# Patient Record
Sex: FEMALE | Race: WHITE | NOT HISPANIC OR LATINO | URBAN - METROPOLITAN AREA
[De-identification: names, ages, dates, MRNs, and addresses within clinical notes are randomized per-mention and may not be internally consistent; named-entity substitution may affect disease eponyms.]

---

## 2017-02-06 ENCOUNTER — OFFICE VISIT - HEALTHEAST (OUTPATIENT)
Dept: FAMILY MEDICINE | Facility: CLINIC | Age: 37
End: 2017-02-06

## 2017-02-06 DIAGNOSIS — F33.1 MODERATE RECURRENT MAJOR DEPRESSION (H): ICD-10-CM

## 2017-02-06 DIAGNOSIS — F41.1 GENERALIZED ANXIETY DISORDER: ICD-10-CM

## 2017-03-21 ENCOUNTER — OFFICE VISIT - HEALTHEAST (OUTPATIENT)
Dept: FAMILY MEDICINE | Facility: CLINIC | Age: 37
End: 2017-03-21

## 2017-03-21 DIAGNOSIS — J06.9 UPPER RESPIRATORY TRACT INFECTION, UNSPECIFIED TYPE: ICD-10-CM

## 2017-03-21 DIAGNOSIS — J02.9 SORETHROAT: ICD-10-CM

## 2017-04-10 ENCOUNTER — OFFICE VISIT - HEALTHEAST (OUTPATIENT)
Dept: FAMILY MEDICINE | Facility: CLINIC | Age: 37
End: 2017-04-10

## 2017-04-10 DIAGNOSIS — F41.1 GENERALIZED ANXIETY DISORDER: ICD-10-CM

## 2017-04-10 DIAGNOSIS — F33.1 MODERATE RECURRENT MAJOR DEPRESSION (H): ICD-10-CM

## 2017-04-10 ASSESSMENT — MIFFLIN-ST. JEOR: SCORE: 1161.69

## 2017-08-18 ENCOUNTER — COMMUNICATION - HEALTHEAST (OUTPATIENT)
Dept: FAMILY MEDICINE | Facility: CLINIC | Age: 37
End: 2017-08-18

## 2017-10-12 ENCOUNTER — AMBULATORY - HEALTHEAST (OUTPATIENT)
Dept: LAB | Facility: CLINIC | Age: 37
End: 2017-10-12

## 2017-10-12 ENCOUNTER — AMBULATORY - HEALTHEAST (OUTPATIENT)
Dept: OBGYN | Facility: CLINIC | Age: 37
End: 2017-10-12

## 2017-10-12 ENCOUNTER — HOSPITAL ENCOUNTER (OUTPATIENT)
Dept: ULTRASOUND IMAGING | Facility: CLINIC | Age: 37
Discharge: HOME | End: 2017-10-12
Attending: ADVANCED PRACTICE MIDWIFE

## 2017-10-12 ENCOUNTER — PRENATAL OFFICE VISIT - HEALTHEAST (OUTPATIENT)
Dept: MIDWIFE SERVICES | Facility: CLINIC | Age: 37
End: 2017-10-12

## 2017-10-12 DIAGNOSIS — O09.529 SUPERVISION OF HIGH-RISK PREGNANCY OF ELDERLY MULTIGRAVIDA: ICD-10-CM

## 2017-10-12 DIAGNOSIS — Z23 INFLUENZA VACCINE ADMINISTERED: ICD-10-CM

## 2017-10-12 LAB — HIV 1+2 AB+HIV1 P24 AG SERPL QL IA: NEGATIVE

## 2017-10-12 ASSESSMENT — MIFFLIN-ST. JEOR: SCORE: 1166.68

## 2017-10-13 LAB
HBV SURFACE AG SERPL QL IA: NEGATIVE
SYPHILIS RPR SCREEN - HISTORICAL: NORMAL

## 2017-11-09 ENCOUNTER — PRENATAL OFFICE VISIT - HEALTHEAST (OUTPATIENT)
Dept: MIDWIFE SERVICES | Facility: CLINIC | Age: 37
End: 2017-11-09

## 2017-11-09 DIAGNOSIS — O09.529 SUPERVISION OF HIGH-RISK PREGNANCY OF ELDERLY MULTIGRAVIDA: ICD-10-CM

## 2017-11-09 DIAGNOSIS — O09.522 ELDERLY MULTIGRAVIDA IN SECOND TRIMESTER: ICD-10-CM

## 2017-11-09 RX ORDER — CHLORAL HYDRATE 500 MG
2 CAPSULE ORAL DAILY
Status: SHIPPED | COMMUNITY
Start: 2017-11-09

## 2017-11-09 ASSESSMENT — MIFFLIN-ST. JEOR: SCORE: 1181.19

## 2017-11-14 ENCOUNTER — OFFICE VISIT - HEALTHEAST (OUTPATIENT)
Dept: FAMILY MEDICINE | Facility: CLINIC | Age: 37
End: 2017-11-14

## 2017-11-14 DIAGNOSIS — F41.1 GENERALIZED ANXIETY DISORDER: ICD-10-CM

## 2017-11-14 DIAGNOSIS — O09.522 ELDERLY MULTIGRAVIDA IN SECOND TRIMESTER: ICD-10-CM

## 2017-11-14 ASSESSMENT — MIFFLIN-ST. JEOR: SCORE: 1182.55

## 2017-12-11 ENCOUNTER — COMMUNICATION - HEALTHEAST (OUTPATIENT)
Dept: MIDWIFE SERVICES | Facility: CLINIC | Age: 37
End: 2017-12-11

## 2017-12-11 ENCOUNTER — AMBULATORY - HEALTHEAST (OUTPATIENT)
Dept: MIDWIFE SERVICES | Facility: CLINIC | Age: 37
End: 2017-12-11

## 2017-12-11 DIAGNOSIS — Z34.82 ENCOUNTER FOR SUPERVISION OF OTHER NORMAL PREGNANCY IN SECOND TRIMESTER: ICD-10-CM

## 2017-12-21 ENCOUNTER — HOSPITAL ENCOUNTER (OUTPATIENT)
Dept: ULTRASOUND IMAGING | Facility: CLINIC | Age: 37
Discharge: HOME | End: 2017-12-21
Attending: ADVANCED PRACTICE MIDWIFE

## 2017-12-21 ENCOUNTER — PRENATAL OFFICE VISIT - HEALTHEAST (OUTPATIENT)
Dept: MIDWIFE SERVICES | Facility: CLINIC | Age: 37
End: 2017-12-21

## 2017-12-21 DIAGNOSIS — O09.529 SUPERVISION OF HIGH-RISK PREGNANCY OF ELDERLY MULTIGRAVIDA: ICD-10-CM

## 2017-12-21 DIAGNOSIS — Z34.82 ENCOUNTER FOR SUPERVISION OF OTHER NORMAL PREGNANCY IN SECOND TRIMESTER: ICD-10-CM

## 2017-12-21 ASSESSMENT — MIFFLIN-ST. JEOR: SCORE: 1219.29

## 2017-12-22 ENCOUNTER — AMBULATORY - HEALTHEAST (OUTPATIENT)
Dept: MIDWIFE SERVICES | Facility: CLINIC | Age: 37
End: 2017-12-22

## 2018-02-06 ENCOUNTER — COMMUNICATION - HEALTHEAST (OUTPATIENT)
Dept: ADMINISTRATIVE | Facility: CLINIC | Age: 38
End: 2018-02-06

## 2018-02-08 ENCOUNTER — PRENATAL OFFICE VISIT - HEALTHEAST (OUTPATIENT)
Dept: MIDWIFE SERVICES | Facility: CLINIC | Age: 38
End: 2018-02-08

## 2018-02-08 DIAGNOSIS — R73.09 ELEVATED GLUCOSE TOLERANCE TEST: ICD-10-CM

## 2018-02-08 DIAGNOSIS — O09.529 SUPERVISION OF HIGH-RISK PREGNANCY OF ELDERLY MULTIGRAVIDA: ICD-10-CM

## 2018-02-08 LAB
FASTING STATUS PATIENT QL REPORTED: NO
GLUCOSE 1H P 50 G GLC PO SERPL-MCNC: 164 MG/DL (ref 70–139)
HGB BLD-MCNC: 11.4 G/DL (ref 12–16)

## 2018-02-08 ASSESSMENT — MIFFLIN-ST. JEOR: SCORE: 1256.94

## 2018-02-09 LAB — SYPHILIS RPR SCREEN - HISTORICAL: NORMAL

## 2018-02-12 ENCOUNTER — AMBULATORY - HEALTHEAST (OUTPATIENT)
Dept: OBGYN | Facility: CLINIC | Age: 38
End: 2018-02-12

## 2018-02-12 ENCOUNTER — HOSPITAL ENCOUNTER (OUTPATIENT)
Dept: OBGYN | Facility: HOSPITAL | Age: 38
Discharge: HOME | End: 2018-02-12
Attending: MIDWIFE | Admitting: MIDWIFE

## 2018-02-12 ENCOUNTER — COMMUNICATION - HEALTHEAST (OUTPATIENT)
Dept: OBGYN | Facility: CLINIC | Age: 38
End: 2018-02-12

## 2018-02-12 ENCOUNTER — AMBULATORY - HEALTHEAST (OUTPATIENT)
Dept: LAB | Facility: CLINIC | Age: 38
End: 2018-02-12

## 2018-02-12 DIAGNOSIS — R73.09 ELEVATED GLUCOSE TOLERANCE TEST: ICD-10-CM

## 2018-02-12 LAB
ALBUMIN UR-MCNC: NEGATIVE MG/DL
APPEARANCE UR: CLEAR
BACTERIA #/AREA URNS HPF: ABNORMAL HPF
BILIRUB UR QL STRIP: NEGATIVE
CLUE CELLS: NORMAL
COLOR UR AUTO: COLORLESS
FASTING STATUS PATIENT QL REPORTED: YES
FIBRONECTIN FETAL VAG QL: NEGATIVE
GLUCOSE 1H P 100 G GLC PO SERPL-MCNC: 86 MG/DL
GLUCOSE 2H P 100 G GLC PO SERPL-MCNC: 85 MG/DL
GLUCOSE 3H P 100 G GLC PO SERPL-MCNC: 76 MG/DL
GLUCOSE P FAST SERPL-MCNC: 83 MG/DL
GLUCOSE UR STRIP-MCNC: NEGATIVE MG/DL
HGB UR QL STRIP: NEGATIVE
KETONES UR STRIP-MCNC: NEGATIVE MG/DL
LEUKOCYTE ESTERASE UR QL STRIP: ABNORMAL
NITRATE UR QL: NEGATIVE
PH UR STRIP: 6 [PH] (ref 4.5–8)
RBC #/AREA URNS AUTO: ABNORMAL HPF
RUPTURE OF FETAL MEMBRANES BY ROM PLUS: NEGATIVE
SP GR UR STRIP: 1 (ref 1–1.03)
SQUAMOUS #/AREA URNS AUTO: ABNORMAL LPF
TRICHOMONAS, WET PREP: NORMAL
UROBILINOGEN UR STRIP-ACNC: ABNORMAL
WBC #/AREA URNS AUTO: ABNORMAL HPF
YEAST, WET PREP: NORMAL

## 2018-02-12 ASSESSMENT — MIFFLIN-ST. JEOR: SCORE: 1256.49

## 2018-02-13 LAB
ALCOHOL, URINE (MT) - HISTORICAL: NEGATIVE GM/DL
ALLERGIC TO PENICILLIN: NO
AMPHETAMINES (MT) - HISTORICAL: NEGATIVE
BACTERIA SPEC CULT: NO GROWTH
C TRACH DNA SPEC QL PROBE+SIG AMP: NEGATIVE
COCAINE (MT) - HISTORICAL: NEGATIVE
CREAT UR-MCNC: 11 MG/DL
GP B STREP DNA SPEC QL NAA+PROBE: NEGATIVE
N GONORRHOEA DNA SPEC QL NAA+PROBE: NEGATIVE
OPIATES (MT) - HISTORICAL: NEGATIVE
OXYCODONE SERPLBLD CFM-MCNC: NEGATIVE NG/ML
PHENCYCLIDINE (PCP)MT - HISTORICAL: NEGATIVE
SPECIMEN STATUS: ABNORMAL
THC MARIJUANA METABOLITE - HISTORICAL: NEGATIVE

## 2018-02-14 ENCOUNTER — COMMUNICATION - HEALTHEAST (OUTPATIENT)
Dept: FAMILY MEDICINE | Facility: CLINIC | Age: 38
End: 2018-02-14

## 2018-02-22 ENCOUNTER — COMMUNICATION - HEALTHEAST (OUTPATIENT)
Dept: MIDWIFE SERVICES | Facility: CLINIC | Age: 38
End: 2018-02-22

## 2018-02-22 DIAGNOSIS — Z20.828 EXPOSURE TO INFLUENZA: ICD-10-CM

## 2018-02-23 ENCOUNTER — PRENATAL OFFICE VISIT - HEALTHEAST (OUTPATIENT)
Dept: MIDWIFE SERVICES | Facility: CLINIC | Age: 38
End: 2018-02-23

## 2018-02-23 DIAGNOSIS — O09.529 SUPERVISION OF HIGH-RISK PREGNANCY OF ELDERLY MULTIGRAVIDA: ICD-10-CM

## 2018-02-23 DIAGNOSIS — O09.523 ELDERLY MULTIGRAVIDA IN THIRD TRIMESTER: ICD-10-CM

## 2018-02-23 ASSESSMENT — MIFFLIN-ST. JEOR: SCORE: 1273.27

## 2018-02-28 ENCOUNTER — OFFICE VISIT - HEALTHEAST (OUTPATIENT)
Dept: FAMILY MEDICINE | Facility: CLINIC | Age: 38
End: 2018-02-28

## 2018-02-28 DIAGNOSIS — F33.0 MILD RECURRENT MAJOR DEPRESSION (H): ICD-10-CM

## 2018-02-28 DIAGNOSIS — F41.1 GENERALIZED ANXIETY DISORDER: ICD-10-CM

## 2018-02-28 ASSESSMENT — MIFFLIN-ST. JEOR: SCORE: 1276.45

## 2018-03-09 ENCOUNTER — PRENATAL OFFICE VISIT - HEALTHEAST (OUTPATIENT)
Dept: MIDWIFE SERVICES | Facility: CLINIC | Age: 38
End: 2018-03-09

## 2018-03-09 DIAGNOSIS — F41.1 GENERALIZED ANXIETY DISORDER: ICD-10-CM

## 2018-03-09 DIAGNOSIS — O09.529 SUPERVISION OF HIGH-RISK PREGNANCY OF ELDERLY MULTIGRAVIDA: ICD-10-CM

## 2018-03-09 ASSESSMENT — MIFFLIN-ST. JEOR: SCORE: 1283.7

## 2018-03-23 ENCOUNTER — PRENATAL OFFICE VISIT - HEALTHEAST (OUTPATIENT)
Dept: MIDWIFE SERVICES | Facility: CLINIC | Age: 38
End: 2018-03-23

## 2018-03-23 DIAGNOSIS — O09.529 SUPERVISION OF HIGH-RISK PREGNANCY OF ELDERLY MULTIGRAVIDA: ICD-10-CM

## 2018-03-23 ASSESSMENT — MIFFLIN-ST. JEOR: SCORE: 1292.78

## 2018-04-05 ENCOUNTER — PRENATAL OFFICE VISIT - HEALTHEAST (OUTPATIENT)
Dept: MIDWIFE SERVICES | Facility: CLINIC | Age: 38
End: 2018-04-05

## 2018-04-05 DIAGNOSIS — O09.529 SUPERVISION OF HIGH-RISK PREGNANCY OF ELDERLY MULTIGRAVIDA: ICD-10-CM

## 2018-04-05 LAB — HGB BLD-MCNC: 11 G/DL (ref 12–16)

## 2018-04-05 ASSESSMENT — MIFFLIN-ST. JEOR: SCORE: 1315.46

## 2018-04-06 LAB
ALLERGIC TO PENICILLIN: NO
GP B STREP DNA SPEC QL NAA+PROBE: NEGATIVE

## 2018-04-13 ENCOUNTER — PRENATAL OFFICE VISIT - HEALTHEAST (OUTPATIENT)
Dept: MIDWIFE SERVICES | Facility: CLINIC | Age: 38
End: 2018-04-13

## 2018-04-13 DIAGNOSIS — O09.529 SUPERVISION OF HIGH-RISK PREGNANCY OF ELDERLY MULTIGRAVIDA: ICD-10-CM

## 2018-04-13 ASSESSMENT — MIFFLIN-ST. JEOR: SCORE: 1319.99

## 2018-04-19 ENCOUNTER — COMMUNICATION - HEALTHEAST (OUTPATIENT)
Dept: MIDWIFE SERVICES | Facility: CLINIC | Age: 38
End: 2018-04-19

## 2018-04-20 ENCOUNTER — PRENATAL OFFICE VISIT - HEALTHEAST (OUTPATIENT)
Dept: MIDWIFE SERVICES | Facility: CLINIC | Age: 38
End: 2018-04-20

## 2018-04-20 DIAGNOSIS — O09.529 SUPERVISION OF HIGH-RISK PREGNANCY OF ELDERLY MULTIGRAVIDA: ICD-10-CM

## 2018-04-20 ASSESSMENT — MIFFLIN-ST. JEOR: SCORE: 1338.14

## 2018-04-27 ENCOUNTER — PRENATAL OFFICE VISIT - HEALTHEAST (OUTPATIENT)
Dept: MIDWIFE SERVICES | Facility: CLINIC | Age: 38
End: 2018-04-27

## 2018-04-27 DIAGNOSIS — O09.529 SUPERVISION OF HIGH-RISK PREGNANCY OF ELDERLY MULTIGRAVIDA: ICD-10-CM

## 2018-04-27 ASSESSMENT — MIFFLIN-ST. JEOR: SCORE: 1333.6

## 2018-05-04 ENCOUNTER — PRENATAL OFFICE VISIT - HEALTHEAST (OUTPATIENT)
Dept: MIDWIFE SERVICES | Facility: CLINIC | Age: 38
End: 2018-05-04

## 2018-05-04 DIAGNOSIS — O48.0 POST-TERM PREGNANCY, 40-42 WEEKS OF GESTATION: ICD-10-CM

## 2018-05-04 DIAGNOSIS — O09.529 SUPERVISION OF HIGH-RISK PREGNANCY OF ELDERLY MULTIGRAVIDA: ICD-10-CM

## 2018-05-04 ASSESSMENT — MIFFLIN-ST. JEOR: SCORE: 1329.06

## 2018-05-07 ENCOUNTER — PRENATAL OFFICE VISIT - HEALTHEAST (OUTPATIENT)
Dept: MIDWIFE SERVICES | Facility: CLINIC | Age: 38
End: 2018-05-07

## 2018-05-07 ENCOUNTER — HOSPITAL ENCOUNTER (OUTPATIENT)
Dept: ULTRASOUND IMAGING | Facility: CLINIC | Age: 38
Discharge: HOME | End: 2018-05-07
Attending: ADVANCED PRACTICE MIDWIFE

## 2018-05-07 DIAGNOSIS — O48.0 POST-TERM PREGNANCY, 40-42 WEEKS OF GESTATION: ICD-10-CM

## 2018-05-07 DIAGNOSIS — O09.529 SUPERVISION OF HIGH-RISK PREGNANCY OF ELDERLY MULTIGRAVIDA: ICD-10-CM

## 2018-05-07 ASSESSMENT — MIFFLIN-ST. JEOR: SCORE: 1342.67

## 2018-05-08 ENCOUNTER — COMMUNICATION - HEALTHEAST (OUTPATIENT)
Dept: OBGYN | Facility: CLINIC | Age: 38
End: 2018-05-08

## 2018-05-08 ENCOUNTER — HOSPITAL ENCOUNTER (OUTPATIENT)
Dept: ULTRASOUND IMAGING | Facility: CLINIC | Age: 38
Discharge: HOME | End: 2018-05-08
Attending: ADVANCED PRACTICE MIDWIFE

## 2018-05-08 ENCOUNTER — PRENATAL OFFICE VISIT - HEALTHEAST (OUTPATIENT)
Dept: MIDWIFE SERVICES | Facility: CLINIC | Age: 38
End: 2018-05-08

## 2018-05-08 DIAGNOSIS — O09.529 SUPERVISION OF HIGH-RISK PREGNANCY OF ELDERLY MULTIGRAVIDA: ICD-10-CM

## 2018-05-08 DIAGNOSIS — O42.90 AMNIOTIC FLUID LEAKING: ICD-10-CM

## 2018-05-08 DIAGNOSIS — O09.523 ELDERLY MULTIGRAVIDA IN THIRD TRIMESTER: ICD-10-CM

## 2018-05-08 LAB — CRYSTALS AMN MICRO: NORMAL

## 2018-05-08 ASSESSMENT — MIFFLIN-ST. JEOR: SCORE: 1338.14

## 2018-05-09 ENCOUNTER — ANESTHESIA - HEALTHEAST (OUTPATIENT)
Dept: OBGYN | Facility: CLINIC | Age: 38
End: 2018-05-09

## 2018-05-09 ENCOUNTER — AMBULATORY - HEALTHEAST (OUTPATIENT)
Dept: MIDWIFE SERVICES | Facility: CLINIC | Age: 38
End: 2018-05-09

## 2018-05-11 ENCOUNTER — HOME CARE/HOSPICE - HEALTHEAST (OUTPATIENT)
Dept: HOME HEALTH SERVICES | Facility: HOME HEALTH | Age: 38
End: 2018-05-11

## 2018-05-13 ENCOUNTER — HOME CARE/HOSPICE - HEALTHEAST (OUTPATIENT)
Dept: HOME HEALTH SERVICES | Facility: HOME HEALTH | Age: 38
End: 2018-05-13

## 2018-05-16 ENCOUNTER — COMMUNICATION - HEALTHEAST (OUTPATIENT)
Dept: OBGYN | Facility: CLINIC | Age: 38
End: 2018-05-16

## 2018-05-22 ENCOUNTER — COMMUNICATION - HEALTHEAST (OUTPATIENT)
Dept: MIDWIFE SERVICES | Facility: CLINIC | Age: 38
End: 2018-05-22

## 2018-06-21 ENCOUNTER — OFFICE VISIT - HEALTHEAST (OUTPATIENT)
Dept: MIDWIFE SERVICES | Facility: CLINIC | Age: 38
End: 2018-06-21

## 2018-06-21 ASSESSMENT — MIFFLIN-ST. JEOR: SCORE: 1258.75

## 2018-11-03 ENCOUNTER — AMBULATORY - HEALTHEAST (OUTPATIENT)
Dept: NURSING | Facility: CLINIC | Age: 38
End: 2018-11-03

## 2018-12-03 ENCOUNTER — OFFICE VISIT - HEALTHEAST (OUTPATIENT)
Dept: FAMILY MEDICINE | Facility: CLINIC | Age: 38
End: 2018-12-03

## 2018-12-03 DIAGNOSIS — F41.1 GENERALIZED ANXIETY DISORDER: ICD-10-CM

## 2018-12-03 DIAGNOSIS — N87.9 DYSPLASIA OF CERVIX: ICD-10-CM

## 2018-12-03 DIAGNOSIS — Z00.00 ROUTINE GENERAL MEDICAL EXAMINATION AT A HEALTH CARE FACILITY: ICD-10-CM

## 2018-12-03 DIAGNOSIS — F33.42 MAJOR DEPRESSIVE DISORDER, RECURRENT EPISODE, IN FULL REMISSION (H): ICD-10-CM

## 2018-12-03 ASSESSMENT — MIFFLIN-ST. JEOR: SCORE: 1225.3

## 2019-05-31 ENCOUNTER — COMMUNICATION - HEALTHEAST (OUTPATIENT)
Dept: TELEHEALTH | Facility: CLINIC | Age: 39
End: 2019-05-31

## 2019-11-04 ENCOUNTER — AMBULATORY - HEALTHEAST (OUTPATIENT)
Dept: NURSING | Facility: CLINIC | Age: 39
End: 2019-11-04

## 2019-11-04 DIAGNOSIS — Z23 NEED FOR INFLUENZA VACCINATION: ICD-10-CM

## 2019-12-11 ENCOUNTER — OFFICE VISIT - HEALTHEAST (OUTPATIENT)
Dept: FAMILY MEDICINE | Facility: CLINIC | Age: 39
End: 2019-12-11

## 2019-12-11 DIAGNOSIS — Z00.00 ROUTINE GENERAL MEDICAL EXAMINATION AT A HEALTH CARE FACILITY: ICD-10-CM

## 2019-12-11 DIAGNOSIS — Z83.3 FAMILY HISTORY OF DIABETES MELLITUS IN FIRST DEGREE RELATIVE: ICD-10-CM

## 2019-12-11 DIAGNOSIS — Z13.1 SCREENING FOR DIABETES MELLITUS: ICD-10-CM

## 2019-12-11 DIAGNOSIS — Z13.21 ENCOUNTER FOR VITAMIN DEFICIENCY SCREENING: ICD-10-CM

## 2019-12-11 DIAGNOSIS — F33.42 MAJOR DEPRESSIVE DISORDER, RECURRENT EPISODE, IN FULL REMISSION (H): ICD-10-CM

## 2019-12-11 DIAGNOSIS — Z13.220 SCREENING CHOLESTEROL LEVEL: ICD-10-CM

## 2019-12-11 DIAGNOSIS — F41.1 GENERALIZED ANXIETY DISORDER: ICD-10-CM

## 2019-12-11 ASSESSMENT — ANXIETY QUESTIONNAIRES
IF YOU CHECKED OFF ANY PROBLEMS ON THIS QUESTIONNAIRE, HOW DIFFICULT HAVE THESE PROBLEMS MADE IT FOR YOU TO DO YOUR WORK, TAKE CARE OF THINGS AT HOME, OR GET ALONG WITH OTHER PEOPLE: SOMEWHAT DIFFICULT
GAD7 TOTAL SCORE: 14
3. WORRYING TOO MUCH ABOUT DIFFERENT THINGS: MORE THAN HALF THE DAYS
4. TROUBLE RELAXING: MORE THAN HALF THE DAYS
5. BEING SO RESTLESS THAT IT IS HARD TO SIT STILL: NOT AT ALL
7. FEELING AFRAID AS IF SOMETHING AWFUL MIGHT HAPPEN: MORE THAN HALF THE DAYS
6. BECOMING EASILY ANNOYED OR IRRITABLE: NEARLY EVERY DAY
1. FEELING NERVOUS, ANXIOUS, OR ON EDGE: NEARLY EVERY DAY
2. NOT BEING ABLE TO STOP OR CONTROL WORRYING: MORE THAN HALF THE DAYS

## 2019-12-11 ASSESSMENT — PATIENT HEALTH QUESTIONNAIRE - PHQ9: SUM OF ALL RESPONSES TO PHQ QUESTIONS 1-9: 3

## 2019-12-11 ASSESSMENT — MIFFLIN-ST. JEOR: SCORE: 1251.72

## 2021-05-26 ASSESSMENT — PATIENT HEALTH QUESTIONNAIRE - PHQ9: SUM OF ALL RESPONSES TO PHQ QUESTIONS 1-9: 3

## 2021-05-28 ASSESSMENT — ANXIETY QUESTIONNAIRES: GAD7 TOTAL SCORE: 14

## 2021-05-30 VITALS — WEIGHT: 125 LBS | BODY MASS INDEX: 21.8 KG/M2

## 2021-05-30 VITALS — WEIGHT: 121.1 LBS | HEIGHT: 61 IN | BODY MASS INDEX: 22.86 KG/M2

## 2021-05-30 VITALS — WEIGHT: 124 LBS | BODY MASS INDEX: 21.62 KG/M2

## 2021-05-31 VITALS — BODY MASS INDEX: 26.81 KG/M2 | WEIGHT: 142 LBS | HEIGHT: 61 IN

## 2021-05-31 VITALS — WEIGHT: 148 LBS | BODY MASS INDEX: 27.94 KG/M2 | HEIGHT: 61 IN

## 2021-05-31 VITALS — BODY MASS INDEX: 25.26 KG/M2 | HEIGHT: 61 IN | WEIGHT: 133.8 LBS

## 2021-05-31 VITALS — WEIGHT: 125.7 LBS | HEIGHT: 61 IN | BODY MASS INDEX: 23.73 KG/M2

## 2021-05-31 VITALS — HEIGHT: 61 IN | WEIGHT: 146.4 LBS | BODY MASS INDEX: 27.64 KG/M2

## 2021-05-31 VITALS — BODY MASS INDEX: 23.68 KG/M2 | WEIGHT: 125.4 LBS | HEIGHT: 61 IN

## 2021-05-31 VITALS — HEIGHT: 61 IN | BODY MASS INDEX: 23.07 KG/M2 | WEIGHT: 122.2 LBS

## 2021-05-31 VITALS — HEIGHT: 61 IN | BODY MASS INDEX: 26.83 KG/M2 | WEIGHT: 142.1 LBS

## 2021-05-31 VITALS — WEIGHT: 145.7 LBS | HEIGHT: 61 IN | BODY MASS INDEX: 27.51 KG/M2

## 2021-06-01 ENCOUNTER — RECORDS - HEALTHEAST (OUTPATIENT)
Dept: ADMINISTRATIVE | Facility: CLINIC | Age: 41
End: 2021-06-01

## 2021-06-01 VITALS — BODY MASS INDEX: 30.02 KG/M2 | HEIGHT: 61 IN | WEIGHT: 159 LBS

## 2021-06-01 VITALS — HEIGHT: 61 IN | BODY MASS INDEX: 30.4 KG/M2 | WEIGHT: 161 LBS

## 2021-06-01 VITALS — HEIGHT: 60 IN | WEIGHT: 146 LBS | BODY MASS INDEX: 28.66 KG/M2

## 2021-06-01 VITALS — WEIGHT: 158 LBS | BODY MASS INDEX: 29.83 KG/M2 | HEIGHT: 61 IN

## 2021-06-01 VITALS — HEIGHT: 61 IN | BODY MASS INDEX: 29.27 KG/M2 | WEIGHT: 155 LBS

## 2021-06-01 VITALS — BODY MASS INDEX: 29.45 KG/M2 | HEIGHT: 61 IN | WEIGHT: 156 LBS

## 2021-06-01 VITALS — WEIGHT: 160 LBS | HEIGHT: 61 IN | BODY MASS INDEX: 30.21 KG/M2

## 2021-06-01 VITALS — BODY MASS INDEX: 28.32 KG/M2 | WEIGHT: 150 LBS | HEIGHT: 61 IN

## 2021-06-01 VITALS — HEIGHT: 61 IN | WEIGHT: 160 LBS | BODY MASS INDEX: 30.21 KG/M2

## 2021-06-02 VITALS — WEIGHT: 136 LBS | BODY MASS INDEX: 25.68 KG/M2 | HEIGHT: 61 IN

## 2021-06-03 VITALS
HEIGHT: 61 IN | SYSTOLIC BLOOD PRESSURE: 112 MMHG | BODY MASS INDEX: 26.94 KG/M2 | HEART RATE: 89 BPM | OXYGEN SATURATION: 99 % | WEIGHT: 142.7 LBS | DIASTOLIC BLOOD PRESSURE: 78 MMHG

## 2021-06-04 NOTE — PROGRESS NOTES
Assessment/Plan:     1. Routine general medical examination at a health care facility     2. Recurrent Major Depression In Full Remission     3. Generalized anxiety disorder     4. Family history of diabetes mellitus in first degree relative     5. Screening for diabetes mellitus  Glucose    CANCELED: Glucose   6. Screening cholesterol level  Lipid [place] FASTING    CANCELED: Lipid [place] FASTING   7. Encounter for vitamin deficiency screening  Vitamin D, Total (25-Hydroxy)    CANCELED: Vitamin D, Total (25-Hydroxy)       1. Annual Physical Exam.  Encouraged healthy lifestyle habits including regular exercise, healthy eating habits, and adequate calcium and vitamin D intake.  Will screen for diabetes, cholesterol and vitamin D level, per patient request.  She is up-to-date on Pap smear and all her health maintenance at this time.  2. History of depression, worsening symptoms recently.  Patient is not interested in starting medication at this time.  I recommend that she follow-up in clinic in 6 to 8 weeks to reassess symptoms.  3. Worsening of anxiety recently, mostly due to life stressors.  We discussed options for treatment including medication options and therapy however patient declines.  Answered questions regarding CBD oil.  She we will follow-up with us in 6 to 8 weeks or sooner with any worsening symptoms.  4. Family history of diabetes in her sister.  Will check fasting glucose later this week when patient is fasting.  5. As above.  6. We will check patient's cholesterol and lab later this week when fasting.  Discussed importance of healthy diet and exercise to decrease risk of developing cardiovascular disease in the future.  7. Reviewed history of vitamin D deficiency.  Will check vitamin D level today.    The following are part of a depression follow up plan for the patient:  mental health screening assessment, mental [place] screening education, mental health treatment assessment and mental health  treatment education          Subjective:     Winnie Hopkins is a 39 y.o. female who presents for an annual exam.  Overall doing well this past year.  She has had worsening and depression anxiety symptoms in recent months.  She feels this is related to stress at home and day today with her kids schedules, work etc.  Previously was on SSRIs but stopped taking this when she became pregnant with her now [id number] baby.  She is hesitant to restart any medication today.  She has questions about CBD oil.  States that her brother was using this for anxiety with some relief.  She denies any suicidal or homicidal ideation.  Patient does not exercise regularly and she feels that this would help with her self-esteem and overall energy level.  She tries to consume healthy diet.  Has family history of diabetes and sister.  She is not fasting today but would like to have lab work done to screen for diabetes and check cholesterol.  Patient has history of abnormal Pap smear several years ago.  Most recent Pap smear in 2016 was normal.  She will be due for follow-up in 2021.  She otherwise reports feeling well overall.  Denies any recent illness or other concerns today.  She is  with 3 children.  Works as a .    Healthy Habits:   Healthy Diet: Yes  Regular Exercise: No  Sunscreen Use: Yes  Dental Visits Regularly: Yes  Self Breast Exam Monthly:No    Health Maintenance reviewed :  Lipid Profile: Yes  Glucose Screen: Yes  Last Mammogram: N/A  Colonoscopy: N/A  Last Dexa: N/A    Immunization History   Administered Date(s) Administered     Influenza, Seasonal, Inj PF IIV3 10/27/2011, 10/18/2013     Influenza, inj, historic,unspecified 11/27/2007, 10/19/2016     Influenza,seasonal quad, PF, =/> 6months 10/12/2017     Influenza,seasonal, Inj IIV3 11/27/2007     Influenza,seasonal,quad inj =/> 6months 11/03/2018, 11/04/2019     Tdap 05/16/2008, 02/08/2018     Immunization status: up to date and  documented.      Gynecologic History  No LMP recorded.  Sexually active: Yes  Contraception: none  Last Pap: . Results were: normal      OB History    Para Term  AB Living   3 3 3 0 0 3   SAB TAB Ectopic Multiple Live Births   0 0 0 0 3      # Outcome Date GA Lbr Cade/2nd Weight Sex Delivery Anes PTL Lv   3 Term 05/10/18 41w3d  8 lb 7 oz (3.827 kg) M Vag-Spont EPI  GRADY   2 Term 12 41w5d 04:15 / 00:19 7 lb 14 oz (3.572 kg) M INDUCTION EPI N GRADY      Birth Comments: Anatoliy   1 Term 05/11/10 40w4d  7 lb 5 oz (3.317 kg) F Vag-Spont EPI N GRADY      Birth Comments: Anatoliy       Current Outpatient Medications   Medication Sig Dispense Refill     CALCIUM ORAL Take by mouth.       cholecalciferol, vitamin D3, (CHOLECALCIFEROL) 1,000 unit tablet Take 1,000 Units by mouth daily.       OMEGA-3/DHA/EPA/FISH OIL (FISH OIL-OMEGA-3 FATTY ACIDS) 300-1,000 mg capsule Take 2 g by mouth daily.       prenat.vits,rin,min-iron-folic (PRENATAL VITAMIN) Tab Take 1 tablet by mouth daily.       No current facility-administered medications for this visit.      Past Medical History:   Diagnosis Date     Abnormal Pap smear of cervix     HPV positive, Colposcopy and LEEP performed in      Anxiety     Currently taking Effexor, plans to wean off during pregnancy and begin seeing therapist.      Depression     Currently taking Effexor, plans to wean off during pregnancy and begin seeing therapist. Denies suicidal ideation.      Past Surgical History:   Procedure Laterality Date     APPENDECTOMY      At 15 weeks GA     MOUTH SURGERY       Sertraline  Family History   Problem Relation Age of Onset     Arthritis Mother      Cancer Mother          Non-hodgkin's lymphoma in 50's     Hypertension Mother      No Medical Problems Father      Asthma Sister      Clotting disorder Sister         History of 2 PE's at age 42 and 44. Hematologic workup did not reveal cause.      Depression Sister      Depression Brother       Hyperlipidemia Brother      Dementia Maternal Grandmother      Heart disease Maternal Grandfather      No Medical Problems Paternal Grandfather      Social History     Socioeconomic History     Marital status:      Spouse name: Kayode     Number of children: 2     Years of education: Not on file     Highest education level: Not on file   Occupational History     Occupation:    Social Needs     Financial resource strain: Not on file     Food insecurity:     Worry: Not on file     Inability: Not on file     Transportation needs:     Medical: Not on file     Non-medical: Not on file   Tobacco Use     Smoking status: Never Smoker     Smokeless tobacco: Never Used   Substance and Sexual Activity     Alcohol use: No     Comment: None in pregnancy     Drug use: No     Sexual activity: Yes     Partners: Male     Birth control/protection: None     Comment: IUD removed in 2013   Lifestyle     Physical activity:     Days per week: Not on file     Minutes per session: Not on file     Stress: Not on file   Relationships     Social connections:     Talks on phone: Not on file     Gets together: Not on file     Attends L[employer] service: Not on file     Active member of club or organization: Not on file     Attends meetings of clubs or organizations: Not on file     Relationship status: Not on file     Intimate partner violence:     Fear of current or ex partner: Not on file     Emotionally abused: Not on file     Physically abused: Not on file     Forced sexual activity: Not on file   Other Topics Concern     Not on file   Social History Narrative     Not on file       Review of Systems  General:  Denies problem  Eyes: Denies problem  Ears/Nose/Throat: Denies problem  Cardiovascular: Denies problem  Respiratory:  Denies problem  Gastrointestinal:  Denies problem, Genitourinary: Denies problem  Musculoskeletal:  Denies problem  Skin: Denies problem  Neurologic: Denies problem  Psychiatric: Denies  "problem  Endocrine: Denies problem  Heme/Lymphatic: Denies problem   Allergic/Immunologic: Denies problem            Objective:        Vitals:    12/11/19 0752   BP: 112/78   Pulse: 89   SpO2: 99%   Weight: 142 lb 11.2 oz (64.7 kg)   Height: 5' 0.5\" (1.537 m)     Body mass index is 27.41 kg/m .    Physical Exam:    General Appearance: Alert, pleasant, appears stated age  Head: Normocephalic, without obvious abnormality  Eyes: PERRL, conjunctiva/corneas clear, EOM's intact  Ears: Normal TM's and external ear canals, both ears  Nose: Nares normal, septum midline,mucosa normal, no drainage  Throat: Lips, mucosa, and tongue normal; teeth and gums normal; oropharynx is clear  Neck: Supple,without lymphadenopathy or thyromegally  Lungs: Clear to auscultation bilaterally, respirations unlabored  Breasts: Nopalpable masses, tenderness, asymmetry, or nipple discharge. No axillary or supraclavicular lymphadenopathy  Heart: Regular rate and rhythm, no murmur   Abdomen: Soft, non-tender, no masses, no organomegaly  Pelvic:Not examined  Extremities: Extremities with strong and symmetric pulses, no cyanosis or edema  Skin: Skin color, texture normal, no rashes or lesions  Neurologic: Normal          This note has been dictated using voice recognition software. Any grammatical or context distortions are unintentional and inherent to the use of this software.     "

## 2021-06-08 NOTE — PROGRESS NOTES
Assessment/Plan:     1. Generalized anxiety disorder  2. Moderate recurrent major depression  Switch from immediate release to extended release venlafaxine to see if we can get a little bit better prolonged effect from it.  Discussed potential side effects, in particular she should monitor to ensure she does not develop sweats or nightmares again with sleep.  Notify me with intolerance, otherwise follow-up in 2 months at most.      Subjective:      Winnie Hopkins is a 36 y.o. female presenting to clinic today for follow-up of anxiety and depression.  Tolerating venlafaxine well, however when she tried taking it twice a day she developed night sweats and significant nightmares, so she has been taking immediate release 37.5 mg once daily.  Feels like her appetite is stable.  Continues to have some tossing and turning with sleep.  Energy level remains poor.  She started to find some enjoyment in things like [employer] in swimming lessons.  Since last visit, she transitioned to a new job, will be working full-time from home.  Her  also transition and sometimes is needing to work late shifts.  Overall things seem to be working out from that standpoint and she feels like it has been a good change.    Current Outpatient Prescriptions   Medication Sig Dispense Refill     cholecalciferol, vitamin D3, (CHOLECALCIFEROL) 1,000 unit tablet Take 1,000 Units by mouth daily.       cyanocobalamin (VITAMIN B-12) 500 MCG tablet Take 500 mcg by mouth daily.       MULTIVITAMIN (MULTIPLE VITAMIN ORAL) Take by mouth.       b complex vitamins tablet Take 1 tablet by mouth daily.       sertraline (ZOLOFT) 50 MG tablet Take 1/2 tablet by mouth daily for 5 days, then one tablet daily. 30 tablet 1     triamcinolone (KENALOG) 0.1 % ointment Apply topically 2 (two) times a day. 60 g 0     venlafaxine (EFFEXOR XR) 37.5 MG 24 hr capsule Take one capsule daily for one week, then 2 capsules daily 60 capsule 2     No current  facility-administered medications for this visit.        Past Medical History, Family History, and Social History reviewed.  No past medical history on file.  No past surgical history on file.  Review of patient's allergies indicates no known allergies.  No family history on file.  Social History     Social History     Marital status:      Spouse name: N/A     Number of children: N/A     Years of education: N/A     Occupational History     Not on file.     Social History Main Topics     Smoking status: Never Smoker     Smokeless tobacco: Not on file     Alcohol use Not on file     Drug use: Not on file     Sexual activity: Not on file     Other Topics Concern     Not on file     Social History Narrative         Review of systems is as stated in HPI, and the remainder of the 10 system review is otherwise negative.    Objective:     Vitals:    02/06/17 0818   BP: 116/76   Pulse: 72   Resp: 16   Weight: 125 lb (56.7 kg)    Body mass index is 21.8 kg/(m^2).    Alert female.  Occasionally mildly tearful but less so than at prior visits.  Thought processes and judgment intact.      This note has been dictated using voice recognition software. Any grammatical or context distortions are unintentional and inherent to the the software.

## 2021-06-09 NOTE — PROGRESS NOTES
Winnie is a 36 y.o. female presenting to the clinic for concerns for possible strep.  Her daughter was diagnosed with strep last week.  Patient developed symptoms this morning.  She complains of a sore throat and nonproductive cough.  She has had nausea and lack of appetite.  She denies headache, stomachache, vomiting, ear pain, and fever.  She has not tried any over-the-counter products for her symptoms.  Her son is also ill.    Review of Systems: A complete 14 point review of systems was obtained and is negative or as stated in the history of present illness.    Social History     Social History     Marital status:      Spouse name: N/A     Number of children: N/A     Years of education: N/A     Occupational History     Not on file.     Social History Main Topics     Smoking status: Never Smoker     Smokeless tobacco: Not on file     Alcohol use Not on file     Drug use: Not on file     Sexual activity: Not on file     Other Topics Concern     Not on file     Social History Narrative       Active Ambulatory Problems     Diagnosis Date Noted     Cervical Dysplasia      Generalized Anxiety Disorder      Moderate recurrent major depression 11/17/2016     Resolved Ambulatory Problems     Diagnosis Date Noted     Breast Pain      Skin Neoplasm Of Uncertain Behavior      Recurrent Major Depression In Full Remission      Dermatitis      Diastasis of muscle      No Additional Past Medical History       No family history on file.    OBJECTIVE:     Visit Vitals     /62 (Patient Site: Right Arm, Patient Position: Sitting, Cuff Size: Adult Regular)     Pulse 64     Temp 98.5  F (36.9  C) (Oral)     Resp 20     Wt 124 lb (56.2 kg)     BMI 21.62 kg/m2       Patient is alert, in no obvious distress.   Skin: Warm, dry.  No lesions or rashes.  Skin turgor rapid return.   HEENT:  Head normocephalic, atraumatic.  Eyes normal. Ears normal.  Nose patent, mucosa red.  Oropharynx erythematous.  No lesions or tonsillar  enlargement.   Neck: Supple, no lymphadenopathy.   Lungs:  Clear to auscultation. Respirations even and unlabored.  No wheezing or rales noted.   Heart:  Regular rate and rhythm.  No murmurs.     LABORATORY: Rapid strep and strep culture ordered.  Rapid strep is negative.    ASSESSMENT AND PLAN:     1. Upper respiratory tract infection, unspecified type     2. Sorethroat  Rapid Strep A Screen-Throat    Group A Strep, RNA Direct Detection, Throat   Discussed symptomatic treatment.  Will wait for strep culture results.  She will follow-up with Dr. Esteves if symptoms persist or worsen.

## 2021-06-10 NOTE — PROGRESS NOTES
"  Assessment/Plan:     1. Generalized anxiety disorder  2. Moderate recurrent major depression  Significant improvement with current dose of medication.  She will consider at current dose.  Follow-up with me in about 4 months.  Continue healthy lifestyle habits.  Continue to seek social support.      The following are part of a depression follow up plan for the patient:  implementation of measures to provide psychological support and emotional support education    Subjective:      Winnie Hopkins is a 36 y.o. female presented to clinic today for follow-up of anxiety and depression.  Doing well on venlafaxine ER 75 mg daily.  Noting that she is less irritable, finding more enjoyment in things, and is finding more interest in things outside of her home.  She has been working from home and is finding the flexibility has been very helpful.  She is noticing improvement in her energy level, finds herself working towards things again and feeling more hopeful.  Still requiring more sleep than she would like, often 9-10 hours at night, still finding that she needs more discipline within her day, but overall improving.  She has been more socially active.  Finds herself in a \"good place\".  Notes that if she misses a dose she sometimes will feel sweaty.  She may also feel little bit dizzy or feel like her head is a bit fussy.  This is a rare occurrence.    Current Outpatient Prescriptions   Medication Sig Dispense Refill     cholecalciferol, vitamin D3, (CHOLECALCIFEROL) 1,000 unit tablet Take 1,000 Units by mouth daily.       cyanocobalamin (VITAMIN B-12) 500 MCG tablet Take 500 mcg by mouth daily.       MULTIVITAMIN (MULTIPLE VITAMIN ORAL) Take by mouth.       venlafaxine (EFFEXOR-XR) 75 MG 24 hr capsule Take 1 capsule (75 mg total) by mouth daily. 90 capsule 3     No current facility-administered medications for this visit.        Past Medical History, Family History, and Social History reviewed.  No past medical history on " "file.  No past surgical history on file.  Review of patient's allergies indicates no known allergies.  No family history on file.  Social History     Social History     Marital status:      Spouse name: N/A     Number of children: N/A     Years of education: N/A     Occupational History     Not on file.     Social History Main Topics     Smoking status: Never Smoker     Smokeless tobacco: Not on file     Alcohol use Not on file     Drug use: Not on file     Sexual activity: Not on file     Other Topics Concern     Not on file     Social History Narrative         Review of systems is as stated in HPI, and the remainder of the 10 system review is otherwise negative.    Objective:     Vitals:    04/10/17 0813   BP: 102/60   Patient Site: Right Arm   Patient Position: Sitting   Cuff Size: Adult Regular   Pulse: 94   SpO2: 100%   Weight: 121 lb 1.6 oz (54.9 kg)   Height: 5' 1\" (1.549 m)    Body mass index is 22.88 kg/(m^2).    Alert female.  Affect within normal limits.  Thought processes and judgment intact.  Normal psychomotor activity.  Frequent smiling at today's visit.      This note has been dictated using voice recognition software. Any grammatical or context distortions are unintentional and inherent to the the software.     "

## 2021-06-13 NOTE — PROGRESS NOTES
PRENATAL VISIT   FIRST OBSTETRICAL EXAM - OB    Assessment / Impression     , Normal first prenatal visit at 10 weeks 3 days  Advanced maternal age  Nausea related to pregnancy  History of anxiety and depression, medication management with desire to wean    Plan:     1. Initial labs drawn including Hgb A1c. Is not interested in waterbirth. Lead screening not indicated per [facility] questionnaire. Declines STI screening.   2. Medications: Prenatal vitamins. Encouraged a vitamin D3 geltab, 4000-5000IU daily and an omega 3 fatty acid supplement daily as well.   3. Problem list reviewed and updated.  4. Genetic screening: discussed and undecided. Provided information on Innatal test and patient plans to discuss with  and check with insurance.   5. Role of ultrasound in pregnancy discussed; dating/viability ultrasound ordered per patient request given family history of multiples.   6. Oriented to [facility] care and philosophy;  group, on-call and contact info discussed.  7. Appropriate anticipatory guidance including nutrition & supplements, weight gain recommendations, exercise, resources, lab testing & warning signs discussed.  Questions answered.   8. Has appointment scheduled with Effexor prescribing provider. Desires to decrease dose and wean off of medication prior to delivery. Discuss risks/benefits associated with staying on medication. Advised to schedule a consult with a therapist since planning to discontinue medication. Provided written information on Effexor in pregnancy as well as local therapists.   9. Is managing low back pain with a chiropractor who specializes in caring for pregnant patients. Reviewed stretches and comfort measures.   Follow up: Plan to return to clinic in 4-6 weeks or sooner PRN.   Total time spent with patient 50 minutes, >50% counseling, education and coordination of care.    10/12/2017  4:54 PM    Subjective:    Winnie is a 37 y.o.  here today for her First Obstetrical Exam.  Here with her  Kayode.This pregnancy is planned.  Attempting pregnancy for approximately 4 years following IUD removal in . Patient's last menstrual period was 2017 (exact date).  Last period was normal. Denies bleeding or cramping since last period. Had her two prior births at [facility]. Has started taking prenatal vitamin. Is using peppermint oil for aromatherapy to manage nausea. Is able to keep food and fluids down. Is seeing a chiropractor to manage back pain with good relief. Desires to wean off of Effexor prior to delivery. Does not desire future pregnancies,  considering vasectomy.      Current symptoms also include: fatigue, constipation, backache, nausea, night sweats.    OB History    Para Term  AB Living   3 2 2 0 0 2   SAB TAB Ectopic Multiple Live Births   0 0 0 0 2      # Outcome Date GA Lbr Cade/2nd Weight Sex Delivery Anes PTL Lv   3 Current            2 Term 12 41w4d 04:15 / 00:19  M INDUCTION EPI N GRADY      Birth Comments: [place]   1 Term 05/11/10 40w4d  7 lb 5 oz (3.317 kg) F Vag-Spont EPI N GRADY      Birth Comments: [place]          Not found.  Past Medical History:   Diagnosis Date     Abnormal Pap smear of cervix     HPV positive, Colposcopy and LEEP performed in      Anxiety     Currently taking Effexor, plans to wean off during pregnancy and begin seeing therapist.      Depression     Currently taking Effexor, plans to wean off during pregnancy and begin seeing therapist. Denies suicidal ideation.      Past Surgical History:   Procedure Laterality Date     APPENDECTOMY      At 15 weeks GA     MOUTH SURGERY       Social History   Substance Use Topics     Smoking status: Never Smoker     Smokeless tobacco: Never Used     Alcohol use No      Comment: None in pregnancy     Current Outpatient Prescriptions   Medication Sig Dispense Refill     prenat.vits,rin,min-iron-folic (PRENATAL VITAMIN) Tab Take 1 tablet by mouth daily.        "venlafaxine (EFFEXOR-XR) 75 MG 24 hr capsule Take 1 capsule (75 mg total) by mouth daily. 90 capsule 3     cholecalciferol, vitamin D3, (CHOLECALCIFEROL) 1,000 unit tablet Take 1,000 Units by mouth daily.       cyanocobalamin (VITAMIN B-12) 500 MCG tablet Take 500 mcg by mouth daily.       MULTIVITAMIN (MULTIPLE VITAMIN ORAL) Take by mouth.       No current facility-administered medications for this visit.      Allergies   Allergen Reactions     Sertraline Rash       Risk factors:  Advanced maternal age, depression and anxiety    Review of Systems  General:  Denies problem  Eyes: Denies problem  Ears/Nose/Throat: Denies problem  Cardiovascular: Denies problem  Respiratory:  Denies problem  Musculoskeletal: low back pain  Gastrointestinal:  nuasea (no vomiting), constipation  Genitourinary: denies problems  Musculoskeletal:  Denies problem  Skin: Denies problem  Neurologic:denies problems  Psychiatric: depression symptoms, anxiety symptoms  Endocrine: fatigue    Objective:   /64 (Patient Site: Left Arm, Patient Position: Sitting, Cuff Size: Adult Regular)  Pulse 60  Ht 5' 1\" (1.549 m)  Wt 122 lb 3.2 oz (55.4 kg)  LMP 07/31/2017 (Exact Date)  Breastfeeding? No  BMI 23.09 kg/m2  Physical Exam:  General Appearance: Alert, cooperative, no distress, appears stated age  Skin: Skin color, texture, turgor normal, no rashes or lesions  HEENT: grossly normal; otoscopic and opthalmic exam not performed.   Neck: Supple, symmetrical, trachea midline, no adenopathy;  thyroid: not enlarged, symmetric, no tenderness/mass/nodules  Lungs: Clear to auscultation bilaterally, respirations unlabored  Breasts: No breast masses, tenderness, asymmetry, or nipple discharge.  Heart: Regular rate and rhythm, S1 and S2 normal, no murmur, rub, or gallop   Abdomen: Soft, non-tender, no masses, no organomegaly.  FHTs 170's  Pelvic:Normally developed genitalia with no external lesions or eruptions. Vagina and cervix show no discharge or " tenderness. No cystocele, No rectocele. Uterus enlarged: consistent with 10 week IUP.  No adnexal mass or tenderness.  Extremities: Extremities normal without varicosities or edema    Patient was seen with student, Fabiana Oliveira RN, SNM who was present for learning. I personally assessed, examined and made clinical decisions reflected in the documentation. MICHAEL Wallace,CNM

## 2021-06-14 NOTE — PROGRESS NOTES
Winnie is her alone today. Feels well with more energy and less food aversion! Notes changes to her body sooner in this pregnancy, has some concerns for RL pain and diastasis separation. Reviewed normal changes in 2nd trimester pregnancy and comfort measures; given information on exercises and body alignment during pregnancy to assist with diastasis separation. Continues to decline genetic screening at this time. Declined early GCT screening; of note hgb A1C not drawn at IOB, does have sister with DM dx at age 40 and had elevated GCT with both pregnancies with normal GTT. Notes she continues on Effexor due to unable to see provider in clinic but has a scheduled appt coming up with intent to wean from this medication. No questions at this time. She is looking into hypnobirthing, given resources. Reviewed together foods to avoid in pregnancy per pt request. Advised mid-pregnancy ultrasound at 20-22w, ordered and given instruction for scheduling. Reviewed warning s/sx and reasons to call. RTC 4-6 weeks.

## 2021-06-14 NOTE — PROGRESS NOTES
Assessment/Plan:     1. Generalized anxiety disorder  2. Elderly multigravida in second trimester  We discussed the risks of medications versus risks of uncontrolled anxiety but currently, during the peripartum timeframe, and postpartum.  As she has been on venlafaxine a functional dose for 1 year, we elect to reduce dose to 37.5 mg daily.  She will follow-up in clinic in approximately 2 months, will notify me sooner if symptoms flare or intolerance to stepdown in therapy.        Subjective:      Winnie Hopkins is a 37 y.o. female presenting to clinic today for follow-up of generalized anxiety disorder.  She is pregnant, approximately 15 weeks gestation currently, is doing well she continues to struggle with fatigue and reduced appetite.  Doing well in regards to anxiety.  Finds some weakness at times but denies feeling anxious.  Has been able to handle stressors both at home and at work without difficulty.  Interested in testing options in regards to venlafaxine during pregnancy.    Current Outpatient Prescriptions   Medication Sig Dispense Refill     CALCIUM ORAL Take by mouth.       cholecalciferol, vitamin D3, (CHOLECALCIFEROL) 1,000 unit tablet Take 1,000 Units by mouth daily.       MULTIVITAMIN (MULTIPLE VITAMIN ORAL) Take by mouth.       OMEGA-3/DHA/EPA/FISH OIL (FISH OIL-OMEGA-3 FATTY ACIDS) 300-1,000 mg capsule Take 2 g by mouth daily.       prenat.vits,rin,min-iron-folic (PRENATAL VITAMIN) Tab Take 1 tablet by mouth daily.       venlafaxine (EFFEXOR-XR) 37.5 MG 24 hr capsule Take 1 capsule (37.5 mg total) by mouth daily. 90 capsule 1     cyanocobalamin (VITAMIN B-12) 500 MCG tablet Take 500 mcg by mouth daily.       No current facility-administered medications for this visit.        Past Medical History, Family History, and Social History reviewed.  Past Medical History:   Diagnosis Date     Abnormal Pap smear of cervix 2000    HPV positive, Colposcopy and LEEP performed in 2000     Anxiety     Currently  "taking Effexor, plans to wean off during pregnancy and begin seeing therapist.      Depression     Currently taking Effexor, plans to wean off during pregnancy and begin seeing therapist. Denies suicidal ideation.      Past Surgical History:   Procedure Laterality Date     APPENDECTOMY  2011    At 15 weeks GA     MOUTH SURGERY       Sertraline  Family History   Problem Relation Age of Onset     Arthritis Mother      Cancer Mother       Non-hodgkin's lymphoma in 50's     Hypertension Mother      No Medical Problems Father      Asthma Sister      Clotting disorder Sister      History of 2 PE's at age 42 and 44. Hematologic workup did not reveal cause.      Depression Sister      Depression Brother      Hyperlipidemia Brother      Dementia Maternal Grandmother      Heart disease Maternal Grandfather      No Medical Problems Paternal Grandfather      Social History     Social History     Marital status:      Spouse name: Kayode     Number of children: 2     Years of education: N/A     Occupational History           Social History Main Topics     Smoking status: Never Smoker     Smokeless tobacco: Never Used     Alcohol use No      Comment: None in pregnancy     Drug use: No     Sexual activity: Yes     Partners: Male     Birth control/ protection: None      Comment: IUD removed in 2013     Other Topics Concern     Not on file     Social History Narrative         Review of systems is as stated in HPI, and the remainder of the 10 system review is otherwise negative.    Objective:     Vitals:    11/14/17 1459   BP: 106/62   Patient Site: Right Arm   Patient Position: Sitting   Cuff Size: Adult Regular   Pulse: 80   Resp: 16   SpO2: 100%   Weight: 125 lb 11.2 oz (57 kg)   Height: 5' 1\" (1.549 m)    Body mass index is 23.75 kg/(m^2).    Alert female.  Normal.  Thought processes and judgment intact. Normal psychomotor activities.      This note has been dictated using voice recognition software. Any " grammatical or context distortions are unintentional and inherent to the the software.

## 2021-06-14 NOTE — PROGRESS NOTES
"Winnie is here today with her family and they are excited for her fetal survey to follow this appt. She feels well, no concerns today. Notes daily movement and no s/sx PTL. Reviewed normal pregnancy changes and discomforts in 2nd trimester and given comfort measures. States she had a URI with cough that is resolving but did \"dislocate\" her rib which her chiropractor is working on weekly. She is worried about her weight gain jump; reviewed just above normal and encouraged to review her choices and continue to focus on activity. Reviewed GCT/hgb/RPR screen at 26-28w and discussed Tdap recommendation; advised 1 hr for this visit. Reviewed warning s/sx and reasons to call. RTC 4-6 w.   "

## 2021-06-15 NOTE — PROGRESS NOTES
Winnie is here today. Notes on Tuesday she experienced a run of contractions over the course of 1.5 hrs; notes she felt dehydrated at that time and so stopped work and then hydrated with good success. She reports no s/sx of vaginitis, abnormal discharge, bladder infection or recent IC to episode. We reviewed appropriate interventions but also encouraged to call CNM on-call with future episodes and encouraged good self care. Today GCT/hgb/RPR screen and recommended tdap which she accepts. Reviewed prenatal visit schedule through [facility]. Reviewed warning s/sx and reasons to call. RTC 2-3 weeks.

## 2021-06-16 PROBLEM — Z83.3 FAMILY HISTORY OF DIABETES MELLITUS IN FIRST DEGREE RELATIVE: Status: ACTIVE | Noted: 2017-11-09

## 2021-06-16 NOTE — H&P
"Outpatient/Triage Note:    Patient Name:  Winnie Hopkins  :      1980  MRN:      [id number]      Assessment:  1.  37-year-old  3 para  with IUP at 29 weeks 0 days  2.  Fetal heart rate category 1, reactive NST appropriate for gestational age  3.   contractions, apparently not in  labor  4.  Advanced maternal age  5.  Elevated 1 hour GCT, followed by a normal 3 hour GTT today  6.  Depression treated with Effexor  7.  Anxiety treated with Effexor    Plan:   -Continuous EFM/toco times greater than 2 hours.  -Regular diet and push p.o. fluids.  Failed IV start attempt.  -Fetal fibronectin, UA with UC if indicated, GBS, wet prep, urine GC chlamydia and U tox sent.  -Status post terbutaline ×1 (ordered by previous CNM)  -Declined SVE considering negative fetal fibronectin and resolution of  contractions.  -Discharge to home undelivered.   -Reviewed warning signs including decreased fetal movement, leaking of fluid, vaginal bleeding, or signs of labor.   -Reviewed how to contact on-call CNM.   -Follow-up in clinic with CNM as sooner than scheduled in ~2 weeks (currently scheduled to return to clinic 3/9/2018) or sooner as needed.   -All questions answered. Agrees with plan.     Subjective:  Winnie Hopkins is a 37 y.o. G 3 p  at 29+0 weeks, with an EDC of 2018 who presented to [facility] for evaluation of frequent contractions this afternoon (please see recent/previous note from MICHAEL Sotelo, NIKKIEM upon arrival to [facility] this afternoon).  Since p.o. fluids, supper and terbutaline injection, patient states she can no longer feel the every 3 minute contractions she felt upon her arrival.  States the contractions were never painful, rather felt like \"tightening\".  Now, not feeling any abdominal tightening.  Denies headache, visual disturbances, upper abdominal pain, recent sexual intercourse, abdominal trauma, leaking of fluid, bleeding, or changes in " "fetal movement.     Objective:  Vital signs: /55  Pulse 85  Temp 98  F (36.7  C) (Oral)   Resp 20  Ht 5' 1\" (1.549 m)  Wt 142 lb (64.4 kg)  LMP 07/31/2017 (Exact Date)  BMI 26.83 kg/m2  FHR: 140, moderate variability, accelerations present, decelerations absent  Uterine contractions: Occasionally now    Physical Exam:   Abdomen: SIUP, cephalic by Leopold's, abdomen non-tender  SVE: Declined  Sterile speculum exam: Deferred  Legs: No edema, moves all freely    Temp:  [98  F (36.7  C)] 98  F (36.7  C)  Heart Rate:  [63-93] 85  Resp:  [20] 20  BP: (106-144)/(55-68) 106/55  No intake or output data in the 24 hours ending 02/12/18 2032    Results:  From today, 2/12/2018:  Wet prep: All negative  Fetal fibronectin:  negative  GBS: Pending  Urine GC/CT: Pending  U tox: Pending  UA: All negative except trace leukocytes, few bacteria and 5-10 squamous epithelial cells    Provider: MICHAEL Toledo, SHEA    TT with patient 40 mn >50% time spent counseling.          "

## 2021-06-16 NOTE — PROGRESS NOTES
Assessment/Plan:     1. Generalized anxiety disorder  2. Mild recurrent major depression  Doing quite well.  We discussed risks versus benefits of venlafaxine during third trimester pregnancy, delivery, and peripartum period.  We elect to attempt discontinuation of venlafaxine.  She is on a low dose of the extended release, she is going to try discontinuing entirely.  Discussed that if she has side effects from doing so, could consider transition to immediate release and wean from there.  Also discussed warning signs and symptoms of worsening anxiety or depression, when to restart medication, to notify me if she feels like this is necessary.      Subjective:      Winnie Hopkins is a 37 y.o. female presenting to clinic today for follow-up of anxiety and depression.  She has been doing very well on venlafaxine, feeling well balanced.  Occasionally she will have some worries that seem to take off but finds that distraction helps relieve the symptoms and feels that overall her anxiety depression have not been disruptive in her life.  Occasionally has had a missed dose of venlafaxine, not some zings and dizziness when this occurs but it is mild and tolerable.  Together we review risks of medications like venlafaxine at time of birth with increased risk of respiratory distress and infants noted, the risk is very low.  She is most interested in attempting weaning.    Current Outpatient Prescriptions   Medication Sig Dispense Refill     CALCIUM ORAL Take by mouth.       cholecalciferol, vitamin D3, (CHOLECALCIFEROL) 1,000 unit tablet Take 1,000 Units by mouth daily.       OMEGA-3/DHA/EPA/FISH OIL (FISH OIL-OMEGA-3 FATTY ACIDS) 300-1,000 mg capsule Take 2 g by mouth daily.       oseltamivir (TAMIFLU) 75 MG capsule Take 1 capsule (75 mg total) by mouth daily for 10 days. 10 capsule 0     prenat.vits,rin,min-iron-folic (PRENATAL VITAMIN) Tab Take 1 tablet by mouth daily.       venlafaxine (EFFEXOR-XR) 37.5 MG 24 hr capsule  Take 1 capsule (37.5 mg total) by mouth daily. 90 capsule 1     No current facility-administered medications for this visit.        Past Medical History, Family History, and Social History reviewed.  Past Medical History:   Diagnosis Date     Abnormal Pap smear of cervix 2000    HPV positive, Colposcopy and LEEP performed in 2000     Anxiety     Currently taking Effexor, plans to wean off during pregnancy and begin seeing therapist.      Depression     Currently taking Effexor, plans to wean off during pregnancy and begin seeing therapist. Denies suicidal ideation.      Past Surgical History:   Procedure Laterality Date     APPENDECTOMY  2011    At 15 weeks GA     MOUTH SURGERY       Sertraline  Family History   Problem Relation Age of Onset     Arthritis Mother      Cancer Mother       Non-hodgkin's lymphoma in 50's     Hypertension Mother      No Medical Problems Father      Asthma Sister      Clotting disorder Sister      History of 2 PE's at age 42 and 44. Hematologic workup did not reveal cause.      Depression Sister      Depression Brother      Hyperlipidemia Brother      Dementia Maternal Grandmother      Heart disease Maternal Grandfather      No Medical Problems Paternal Grandfather      Social History     Social History     Marital status:      Spouse name: Kayode     Number of children: 2     Years of education: N/A     Occupational History           Social History Main Topics     Smoking status: Never Smoker     Smokeless tobacco: Never Used     Alcohol use No      Comment: None in pregnancy     Drug use: No     Sexual activity: Yes     Partners: Male     Birth control/ protection: None      Comment: IUD removed in 2013     Other Topics Concern     Not on file     Social History Narrative         Review of systems is as stated in HPI, and the remainder of the 10 system review is otherwise negative.    Objective:     Vitals:    02/28/18 0930   BP: 100/58   Patient Site: Right Arm  "  Patient Position: Sitting   Cuff Size: Adult Regular   Pulse: 60   Resp: 20   Weight: 146 lb 6.4 oz (66.4 kg)   Height: 5' 1\" (1.549 m)    Body mass index is 27.66 kg/(m^2).    Alert female.  Affect within normal limits.  Thought processes and judgment intact.      This note has been dictated using voice recognition software. Any grammatical or context distortions are unintentional and inherent to the the software.   "

## 2021-06-16 NOTE — PROGRESS NOTES
Here today with family. Notes she feels well but was emotional this week as she feels ready to be done with pregnancy discomforts. Notes overall she feels stable emotionally. EPDS = 6; negative #10, no concerns and has a mindful plan for postpartum care and initiation of antidepressant. Baby feels vertex by leopolds today. Reviewed GBS/hgb screen and VE at [place] and weekly visits thereafter. Reviewed warning s/sx and reasons to call. RTC 2 weeks.

## 2021-06-16 NOTE — PROGRESS NOTES
"Outpatient/Triage Note:    Patient Name:  Winnie Hopkins  :      1980  MRN:      [id number]    Subjective  Winnie Hopkins is a 37 y.o.  who presented to [facility] for evaluation of  contractions. Winnei is a  who was working at home sitting at her desk when she noticed 7 contractions which occurred between 1515 - 1547. She did the 3 hr GTT today (passed) so was dehydrated this morning. Additionally, her sister is having a cardiac ablation today @ [facility]. While up to void at home she had another large stream of urine after initial void and \"I don't think my water broke but I'm not sure.\" Denies leaking after void and did not wear a pad thereafter. Denies bleeding. Normal fetal movement. H/O anxiety and depression and currently taping Effexor now on 37.5 mg. Accompanied by family. States she is feeling anxious. Denies symptoms of UTI (no H/O UTIs) or vaginitis. Occasionally aware of slightly malodorous d/c but not today.  Objective  Temp:  [98  F (36.7  C)] 98  F (36.7  C)  Heart Rate:  [63] 63  Resp:  [20] 20    Physical Exam:    General appearance: Slightly shaky, verbalizes being anxious  Skin: Pink, warm & dry  HEENT: unremarkable  Cardiovascular:  RRR, S1, S2, no extra sounds or murmurs  Respiratory:  breath sounds CTA bilaterally, anteriorly and posteriorly  Abdomen: soft, NT, gravid  Leopolds: Vertex palpated above symphysis, AGA    FHR:Baseline: 150 bpm, Variability: Moderate (6 - 25 bpm), Accelerations:  and Decelerations: Absent    Uterine contractions:TocoFrequency: Every 3-4 minutes, initially now 5-6 minutes x 40 seconds after voiding. Mild to palpation    SVE:Deferred. No discharge seen with coughing.     Extremeties: no edema  +1/+4 patellar DTRs bilaterally    Assessment:  G [id number] @ 29w 0d, PT contractions    Plan:   1. EFM placed  2. Labs done: FfN, wet prep (all negative), GBS, ROM +, drugs of abuse, chlamydia/gonorrhea  3. Start IV fluids  4. Brief consult " with Dr. Hoffman and terbutaline protocal started.    Total time spent with patient:30 minutes, >50% spent on counseling and coordination of care.    Provider:  MICHAEL Frausto CNM  Date:  2/12/2018  Time:  5:53 PM

## 2021-06-16 NOTE — PROGRESS NOTES
Winnie is here alone today for her routine prenatal appt at 30w4d. [place] was [place] [facility] [place] for PTL, treated with Terbutaline and released, not having regular contractions now. 7 yr old daughter diagnosed with the flu and Rx for Tamiflu was sent in for Winnie yesterday, she picked it up and took first dose last night. She is asymptomatic, no fever. Call prn if S/S of PTL.

## 2021-06-16 NOTE — PROGRESS NOTES
Here alone today. Feels well, no further regular UCs. Notes she weaned from effexor with PCP last week and was emotional for a few days after but feels more stable now; therapeutic listening provided as she felt bad for her words/actions with her children but did come together with them afterward and resolved those feelings. She offers no concerns today. Has been discussing expectations of the birth with Kayode and feels uncertain but also confident as a 3rd time mother. Completed the breastfeeding survey: Plans breastfeeding only, Notes decision influenced by health benefits, reports has supportive partner/family, Is not planning a class, discussed options available, is not signed up for WIC and not eligible, reports prior breastfeeding experience for 12 and 9 months and notes challenges: slow  weigh gain with youngest and peds provider requested wean at 9 months and we reviewed informal and professional resources available to support her breastfeeding goals and declined prenatal lactation referral. We discussed baby's position by leopolds feels breech today; advised will continue to monitor and given information for exercises to initiate and discussed plan if persists as breech by leopolds. Reviewed warning s/sx and reasons to call. RTC 2 weeks.

## 2021-06-17 NOTE — PROGRESS NOTES
Feeling ok, ready for baby!  Trying to relax and allow baby to come when he is ready.  Lots of BH ctx, nothing regular.  Reviewed normal BPP results.  NST today reactive: 125bpm, moderate variability, + accels, no decels  Irregular contractions noted on monitor.  Reviewed labor precautions.  Recommend repeat NST later this week (Thurs or Fri)

## 2021-06-17 NOTE — PROGRESS NOTES
Here today with her family. Feels well, notes normal FM, but notes more emotional lability. Having contractions but never more frequent than q 10-20m. Brief review of normalcy of spontaneous labor after DUONG and post-dates management including IOL at 41w r/b/a and expectant management r/b/a. Discussed risks of IOL at ~41 weeks including increased risk of uterine overstimulation, fetal distress, prolonged or dysfunctional labor, maternal fatigue, increased need for pharmacologic pain management, increased risk (possible double) of  and increased risk of hemorrhage. Winnie at this time desires expectant management. We will plan for RTC 1 week and then 41 week visit. Reviewed warning s/sx and reasons to call.

## 2021-06-17 NOTE — PROGRESS NOTES
Here with her children today. Feeling nauseated today with some fatigue; notes contractions perhaps more frequently today but not sure if more intense. Rec'ed breast pump through insurance. Discussed plan for maternity leave and hopes for BF x 1 year. Will plan for circ. Desires VE today, subtle change today. Reviewed warning s/sx and reasons to call. RTC 1 week.

## 2021-06-17 NOTE — PROGRESS NOTES
"Winnie Hopkins is here for QAMAR at 41w1d. Here for problem visit due to possible ROM. Spoke to on-call CNM earlier. Noted wetness in her underwear at 1030 this morning while doing \"cat and \"cow\" positions. Does not think it was urine. Several hours later she did this position again and felt another episode of increased wetness in her underwear. Did not have a large gush of fluid or any trickling down her leg but feels it is a change compared to normal amount of discharge and more liquidy. Baby is active. Has had contractions irregularly for 1 week but they have gotten stronger since this afternoon. GBS negative, clear fluid, afebrile, active FM present.    NST: Reactive. , MV, accelerations present, decelerations absent. [place]: Q 3-7 min contractions, palpate mild to moderate. Talking through them.  SSE only: clear fluid pooled in vagina, valsalva positive. Fern positive.   Temp: 36.5 oral.    Discussed that about 8-10% of women have their membranes release before they go into labor. Reviewed ACOG's recommendation to induce labor immediately after PROM. Women whose membranes release before labor are at higher risk for developing chorioamnionitis, an intrauterine/membrane infection. Signs of this infection are maternal fever, fetal tachycardia, maternal tachycardia, abdominal pain, WBC count >15,000, and foul smelling amniotic fluid. Reviewed the likelihood that she will go into labor by 24 hours. Most women will go into labor on their own, 45% of women will go into labor by 12 hours, 77-95% of women will go into labor by 24 hours. Discussed that the longer it has been since her membranes have released to the time of delivery and introduction of bacteria into the vaginal canal with vaginal exams increases the risk of infection. Choosing a medical induction of labor shortens the time from rupture to her baby's birth, decreases the chance that she develops chorioamnionitis, decreases the chance her baby will need " "to have antibiotics and/or be admitted to the special care nursery. Studies show there is not a significantly increased rate of  infection among women who choose expectant management, but sometime those babies are observed longer in the special care nursery. Expectant management is a reasonable option, especially if she watches carefully for signs of infection and avoids inserting anything into her vagina.     \"I want to be in the comfort of my home\". Pt desires expectant management pending US to confirm fetal presentation which was able to be scheduled for ~7pm tonight at . Will go home if vertex. If non-vertex radiology will contact CNM on-call and send patient to [facility]. Advised that she check her temperature every 3 hours while she is awake and to call if her temperature is >100.4 (38 C) with chills or feeling ill. I also advised her to do kick counts and let us know if she is noticing any decreased fetal movement. Encouraged that she observe for fluid color changes and to call if not clear. On call SHEA Castellon, SHEA notified of patient's status. Plan also made for her to be called in the morning if she does not call for labor during the night as she should come to hospital no later than 24 hour laura.      "

## 2021-06-17 NOTE — PROGRESS NOTES
"Winnie is here with her children and  today. Notes contractions daily and with greater intensity today that she feels \"pulling down\". She is a bit disheartened to learn no change to her cervix. We discussed membrane sweeping and ultimately she elected to decline today. We reviewed recommendations for 41 week fetal monitoring; BPP ordered and given recommendations for scheduling visits at 41w0d and approx 3-4 days later. Reviewed warning s/sx and reasons to call. RTC Monday.   "

## 2021-06-17 NOTE — ANESTHESIA PROCEDURE NOTES
Epidural Block    Patient location during procedure: OB  Time Called: 5/9/2018 1:56 PM  Reason for Block:labor epidural  Staffing:  Performing  Anesthesiologist: AMANDA SMITH  Preanesthetic Checklist  Completed: patient identified, risks, benefits, and alternatives discussed, timeout performed, consent obtained, at patient's request, airway assessed, oxygen available, suction available, emergency drugs available and hand hygiene performed  Procedure  Patient position: sitting  Prep: ChloraPrep  Patient monitoring: continuous pulse oximetry, heart rate and blood pressure  Approach: midline  Location: L3-L4  Injection technique: PRASHANT saline  Number of Attempts:1  Needle  Needle type: [employer]   Needle gauge: 18 G     Catheter in Space: 4  Assessment  Sensory level:  No complications      Additional Notes:  Tolerated well, easily placed 1st pass

## 2021-06-17 NOTE — PROGRESS NOTES
Here alone today. Feels well and readying for birth. Notes normal FM and no increase in UCs. Has no concerns today. GBS/hgb screen done today (GBS repeated today due to early GA with first screen at 29w). VE today, floating presenting part sutures palpated with effort, feels vertex by leopolds. Discussed PP supports. We reviewed her written birth plan together and answered questions, submitted for scanning. Reviewed warning s/sx and reasons to call. RTC 1 week. Inquire about her leave and breast pump next visit.

## 2021-06-17 NOTE — ANESTHESIA PREPROCEDURE EVALUATION
Anesthesia Evaluation      Patient summary reviewed   No history of anesthetic complications     Airway   Mallampati: II   Pulmonary                           Cardiovascular    Neuro/Psych      Endo/Other    (+) pregnant     GI/Hepatic/Renal            Dental                         Anesthesia Plan  Planned anesthetic: epidural    ASA 2     Anesthetic plan and risks discussed with: patient    Post-op plan: epidural analgesia      ANESTHESIOLOGY LABOR EPIDURAL ASSESSMENT NOTE     Chart reviewed. Patient examined.     Procedure and its risks, including headache, nerve damage, bleeding, infection, back pain, low blood pressure and non success, discussed fully with patient and family.   Patient denies history of PIH, pre-eclampsia, blood thinners, or bleeding tendencies. Patient denies history of anesthetic complications.   Opportunity for questions given.     Informed consent obtained. Pt desires to precede with this elective procedure.   Pause prior to procedure done.     Of note, sterile technique used for epidural placement. Chlorhexidine, hand foam, sterile gloves, mask and hat.    Diane Anthony MD 5/9/2018

## 2021-06-17 NOTE — ANESTHESIA POSTPROCEDURE EVALUATION
Patient: Winnie Hopkins  * No procedures listed *  Anesthesia type: epidural    Patient location: Labor and Delivery  Last vitals:   Vitals:    05/10/18 0515   BP: 108/59   Pulse: 83   Resp:    Temp:    SpO2: 100%     Post vital signs: stable  Level of consciousness: awake, alert and oriented  Post-anesthesia pain: pain controlled  Post-anesthesia nausea and vomiting: no  Pulmonary: unassisted, return to baseline  Cardiovascular: stable and blood pressure at baseline  Hydration: adequate  Anesthetic events: no    QCDR Measures:  ASA# 11 - [place]-op Cardiac Arrest: ASA11B - Patient did NOT experience unanticipated cardiac arrest  ASA# 12 - [place]-op Mortality Rate: ASA12B - Patient did NOT die  ASA# 13 - PACU Re-Intubation Rate: NA - No ETT / LMA used for case  ASA# 10 - Composite Anes Safety: ASA10A - No serious adverse event    Additional Notes:

## 2021-06-17 NOTE — PROGRESS NOTES
Winnie is here today with her family. Feels well but ready not to share the inside her body! Reviewed normal labs; did discuss borderline normal iron level and given recommendations. Further discussed pain management options; attempted medication free births with first two and had epidurals with both and desires unmedicated birth this time. She worries that she should have hired a . We discussed the supports available to her including the volunteer  and the CNM, encouraged to engage this support early. Also answered questions about epidurals. Thinks she lost her mucous plug last week; desires VE today. Given reassurance today. Reviewed warning s/sx and reasons to call. RTC 1 week. Inquire about leave and breast pump NV.

## 2021-06-18 NOTE — PROGRESS NOTES
Routine 6 week Postpartum Visit  Assessment:   Normal postpartum exam at ~6 weeks postpartum.   lactating  Depression with Anxiety, medication and BHT management    Plan:    1. Pap smear not indicated at today's visit. Due for annual Gyn exam in 2019   2. Desired contraception: vasectomy. Reviewed timeline and encouraged to call if desires bridge until sterile.  3. Discussed resumption of exercise and setting a goal to return to pre-pregnancy weight in the next 6-12 months.   4.  Resumption of intercourse reviewed with possible changes in libido and vaginal lubrication while nursing.  5.  Nutrition and supplements reviewed.  Advised continuation of a prenatal or multivitamin, also Vitamin D3 5000 IU geltab daily and an omega 3 fatty acid supplement.  6. Adjustment to parenting, self care and open communication with her support system discussed. Warning signs and symptoms related to postpartum mood disorders reviewed.     Subjective:     Winnie Hopkins is a 38 y.o. female who presents for postpartum visit. She is 6 weeks postpartum following a NSVB with this writer!  I have fully reviewed the prenatal and intrapartum course. The delivery was at Term and 41 weeks gestation Her baby boy is named Keshawn and weighed 8 lbs 7 oz at birth.     Postpartum course has been stable. She is tearful today about role transition with a  after independence with two older children. Baby Keshawn's course has been stable. Baby is feeding by breast and bottle with EBM. Lochia ceased at 4 weeks postpartum.  Bowel function is normal, reports no constipation. Bladder function is normal, reports scant leaking urine with stress of movement or laughing; improving since birth. She has not resumed intercourse. Desired contraception: vasectomy. [place] postpartum depression screening score: 5, negative #10, no concerns. We did discuss her current management and feels stable. She has not resumed regular exercise but is researching diastasis  and pelvic floor support exercises. Winnie returns to work in 6 weeks.    Review of Systems  General:  Denies problem  Eyes: Denies problem  Ears/Nose/Throat: Denies problem  Cardiovascular: Denies problem  Respiratory:  Denies problem  Gastrointestinal:  Denies problem, Genitourinary: Denies problem  Musculoskeletal:  Denies problem  Skin: Denies problem  Neurologic: Denies problem  Psychiatric: Denies Problem  Endocrine: Denies problem    Objective:         Physical Exam:  General Appearance: Alert, cooperative, no distress, appears stated age  Skin: Skin color, texture, turgor normal, no rashes or lesions  Throat: Lips, mucosa, and tongue normal; teeth and gums normal  HEENT: grossly normal; otoscopic and opthalmic exam not performed.   Neck: Supple, symmetrical, trachea midline, no adenopathy;  thyroid: not enlarged, symmetric, no tenderness/mass/nodules  Lungs: Clear to auscultation bilaterally, respirations unlabored  Breasts: No breast masses, tenderness, asymmetry, or nipple discharge. Nipples everted. Lactating.   Heart: Regular rate and rhythm, S1 and S2 normal, no murmur, rub, or gallop  Abdomen: Soft, non-tender.  Diastasis 3 FB.  Pelvic:External genitalia normal without lesions or irritation. Vagina and cervix show no lesions, inflammation, discharge or tenderness. 2nd degree perineal laceration well approximated and well healed.   No cystocele, No rectocele. Uterus fully involuted.  No adnexal mass or tenderness. Moderate vaginal tone with kegel on bimanual.  Extremities: Extremities normal without varicosities or edema       Last Pap: 7/2016. Results were: NIL and HPV neg  Immunization History   Administered Date(s) Administered     Influenza, Seasonal, Inj PF IIV3 10/27/2011, 10/18/2013     Influenza, inj, historic,unspecified 11/27/2007, 10/19/2016     Influenza,seasonal quad, PF, 36+MOS 10/12/2017     Influenza,seasonal, Inj IIV3 11/27/2007     Tdap 05/16/2008, 02/08/2018     Immunization status:  up to date and documented    Total time with patient 40 minutes with >50% on education, counseling and coordination of care.    Katelynn Rodriguez, DNP, APRN, CNM

## 2021-06-22 NOTE — PROGRESS NOTES
Assessment/Plan:     1. Routine general medical examination at a health care facility  Encouraged healthy lifestyle habits including regular exercise, healthy eating habits, and adequate calcium and vitamin D intake.  Declines contraception, plans to use condoms and pursue vasectomy by her partner.  She will let me know if she requires further assistance with this.  Up-to-date with routine vaccinations.    2. Recurrent Major Depression In Full Remission  3. Generalized anxiety disorder  She remains in remission without need for medication.  Encouraged healthy lifestyle habits.    4. Cervical Dysplasia  Status post LEEP in 2000, normal Paps since then and may continue routine screening.    5. Diastasis Rectii  She will pursue physical therapy as recommended by her nurse midwife.  Will let me know further orders are required.      Patient Instructions   Work on adding exercise back into her regular routine.    Explore physical therapy options as recommended by her midwife.  Please have the physical therapist fax me a request for orders if you elect to move forward with physical therapy.       No Follow-up on file.          Subjective:     Winnie Hopkins is a 38 y.o. female who presents for an annual exam.  She has no concerns today.  Struggles with diastases recti following her second pregnancy, her nurse midwife for that pregnancy had recommended a specific physical therapist, patient is interested in pursuing.  History of anxiety and depression for which she previously took Effexor, notes that overall she is been doing okay and feels balanced.  Occasionally will feel slightly depressed or anxious but not ongoing.  History of LEEP in 2000 for cervical dysplasia, Paps have been normal since.  Not currently using any contraception, currently abstinent notes she has had a single menstrual period since birth of her son 6 months ago, she is breast-feeding as well.  She would like me to take a look at her vaginal  introitus that she feels things are just a little bit different since the birth of her third son.    We note she had a normal fasting blood sugar and normal lipids in 2016.  She is not fasting today.    Healthy Habits:   Healthy Diet: Yes  Regular Exercise: No  Sunscreen Use: Yes  Dental Visits Regularly: Yes  Seat Belt: Yes  Domestic abuse:   No    Health Maintenance reviewed:  Lipid Profile: not fasting  Glucose Screen: not fasting    Gynecologic History  No LMP recorded.  Contraception: abstinence for now  Last Pap: 7/21/16. Results were: normal, HPV neg        Immunization History   Administered Date(s) Administered     Influenza, Seasonal, Inj PF IIV3 10/27/2011, 10/18/2013     Influenza, inj, historic,unspecified 11/27/2007, 10/19/2016     Influenza, seasonal,quad inj 36+ mos 11/03/2018     Influenza,seasonal quad, PF, 36+MOS 10/12/2017     Influenza,seasonal, Inj IIV3 11/27/2007     Tdap 05/16/2008, 02/08/2018     Immunization status: up to date and documented.    Current Outpatient Medications   Medication Sig Dispense Refill     CALCIUM ORAL Take by mouth.       cholecalciferol, vitamin D3, (CHOLECALCIFEROL) 1,000 unit tablet Take 1,000 Units by mouth daily.       OMEGA-3/DHA/EPA/FISH OIL (FISH OIL-OMEGA-3 FATTY ACIDS) 300-1,000 mg capsule Take 2 g by mouth daily.       prenat.vits,rin,min-iron-folic (PRENATAL VITAMIN) Tab Take 1 tablet by mouth daily.       No current facility-administered medications for this visit.      Past Medical History:   Diagnosis Date     Abnormal Pap smear of cervix 2000    HPV positive, Colposcopy and LEEP performed in 2000     Anxiety     Currently taking Effexor, plans to wean off during pregnancy and begin seeing therapist.      Depression     Currently taking Effexor, plans to wean off during pregnancy and begin seeing therapist. Denies suicidal ideation.      Past Surgical History:   Procedure Laterality Date     APPENDECTOMY  2011    At 15 weeks GA     MOUTH SURGERY        Sertraline  Family History   Problem Relation Age of Onset     Arthritis Mother      Cancer Mother          Non-hodgkin's lymphoma in 50's     Hypertension Mother      No Medical Problems Father      Asthma Sister      Clotting disorder Sister         History of 2 PE's at age 42 and 44. Hematologic workup did not reveal cause.      Depression Sister      Depression Brother      Hyperlipidemia Brother      Dementia Maternal Grandmother      Heart disease Maternal Grandfather      No Medical Problems Paternal Grandfather      Social History     Socioeconomic History     Marital status:      Spouse name: Kayode     Number of children: 2     Years of education: Not on file     Highest education level: Not on file   Social Needs     Financial resource strain: Not on file     Food insecurity - worry: Not on file     Food insecurity - inability: Not on file     Transportation needs - medical: Not on file     Transportation needs - non-medical: Not on file   Occupational History     Occupation:    Tobacco Use     Smoking status: Never Smoker     Smokeless tobacco: Never Used   Substance and Sexual Activity     Alcohol use: No     Comment: None in pregnancy     Drug use: No     Sexual activity: Yes     Partners: Male     Birth control/protection: None     Comment: IUD removed in 2013   Other Topics Concern     Not on file   Social History Narrative     Not on file       Review of Systems  General:  Denies problem  Eyes: Denies problem  Ears/Nose/Throat: Denies problem  Cardiovascular: Denies problem  Respiratory:  Denies problem  Gastrointestinal:  Denies problem   Genitourinary: Denies problem  Musculoskeletal:  Denies problem  Skin: Denies problem  Neurologic: Denies problem  Psychiatric: Denies problem  Endocrine: Denies problem  Heme/Lymphatic: Denies problem   Allergic/Immunologic: Denies problem            Objective:        Vitals:    12/03/18 1014   BP: 118/84   Pulse: 66   SpO2: 99%   Weight:  "136 lb (61.7 kg)   Height: 5' 0.75\" (1.543 m)     Body mass index is 25.91 kg/m .    Physical Exam:  General Appearance: Alert, pleasant, appears stated age  Head: Normocephalic, without obvious abnormality  Eyes: PERRL, conjunctiva/corneas clear, EOM's intact  Ears: Normal TM's and external ear canals, both ears  Nose: Nares normal, septum midline,mucosa normal, no drainage  Throat: Lips, mucosa, and tongue normal; teeth and gums normal; oropharynx is clear  Neck: Supple,without lymphadenopathy or thyromegally  Lungs: Clear to auscultation bilaterally, respirations unlabored  Heart: Regular rate and rhythm, no murmur   Breast:  Normal appearance.  No palpable masses, nipple discharge, or skin changes  Abdomen: Soft, non-tender, no masses, no organomegaly; stasis rectus present with approximately 5 cm gap between muscles palpable  Extremities: Extremities with strong and symmetric pulses, no cyanosis or edema  Skin: Skin color, texture normal, no rashes or lesions  Neurologic: Normal   Pelvic exam: External female genitalia examined, a see some scar tissue from vaginal tear and an area that is well healed but it appears that not all of the tissue fused at her posterior introitus so there is a little bit more of a gap there than she had previously., however I see no concerning findings.               This note has been dictated using voice recognition software. Any grammatical or context distortions are unintentional and inherent to the the software.    "

## 2021-07-14 PROBLEM — O48.0 POST-TERM PREGNANCY, 40-42 WEEKS OF GESTATION: Status: RESOLVED | Noted: 2018-05-04 | Resolved: 2018-05-10

## 2021-07-14 PROBLEM — O47.00 PRETERM CONTRACTIONS: Status: RESOLVED | Noted: 2018-02-12 | Resolved: 2018-02-23

## 2021-07-14 PROBLEM — O09.529 SUPERVISION OF HIGH-RISK PREGNANCY OF ELDERLY MULTIGRAVIDA: Status: RESOLVED | Noted: 2017-10-12 | Resolved: 2018-05-11

## 2021-07-14 PROBLEM — O09.523 ELDERLY MULTIGRAVIDA IN THIRD TRIMESTER: Status: RESOLVED | Noted: 2017-11-09 | Resolved: 2018-05-10

## 2021-07-14 PROBLEM — Z34.90 PREGNANT: Status: RESOLVED | Noted: 2018-05-09 | Resolved: 2018-05-10

## 2021-07-14 PROBLEM — O42.90 AMNIOTIC FLUID LEAKING: Status: RESOLVED | Noted: 2018-05-08 | Resolved: 2018-05-10

## 2021-08-21 ENCOUNTER — HEALTH MAINTENANCE LETTER (OUTPATIENT)
Age: 41
End: 2021-08-21

## 2021-10-16 ENCOUNTER — HEALTH MAINTENANCE LETTER (OUTPATIENT)
Age: 41
End: 2021-10-16

## 2022-10-01 ENCOUNTER — HEALTH MAINTENANCE LETTER (OUTPATIENT)
Age: 42
End: 2022-10-01

## 2023-10-15 ENCOUNTER — HEALTH MAINTENANCE LETTER (OUTPATIENT)
Age: 43
End: 2023-10-15

## 2024-03-03 ENCOUNTER — HEALTH MAINTENANCE LETTER (OUTPATIENT)
Age: 44
End: 2024-03-03